# Patient Record
Sex: FEMALE | Race: WHITE
[De-identification: names, ages, dates, MRNs, and addresses within clinical notes are randomized per-mention and may not be internally consistent; named-entity substitution may affect disease eponyms.]

---

## 2017-10-20 ENCOUNTER — HOSPITAL ENCOUNTER (EMERGENCY)
Dept: HOSPITAL 82 - ED | Age: 47
Discharge: HOME | DRG: 605 | End: 2017-10-20
Payer: SELF-PAY

## 2017-10-20 VITALS — HEIGHT: 66 IN | WEIGHT: 220.46 LBS | BODY MASS INDEX: 35.43 KG/M2

## 2017-10-20 VITALS — DIASTOLIC BLOOD PRESSURE: 73 MMHG | SYSTOLIC BLOOD PRESSURE: 140 MMHG

## 2017-10-20 DIAGNOSIS — S20.211A: Primary | ICD-10-CM

## 2017-10-20 DIAGNOSIS — M54.5: ICD-10-CM
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
## 2017-11-10 ENCOUNTER — HOSPITAL ENCOUNTER (EMERGENCY)
Dept: HOSPITAL 82 - ED | Age: 47
Discharge: HOME | DRG: 153 | End: 2017-11-10
Payer: SELF-PAY

## 2017-11-10 VITALS — HEIGHT: 66 IN | WEIGHT: 223.11 LBS | BODY MASS INDEX: 35.86 KG/M2

## 2017-11-10 VITALS — DIASTOLIC BLOOD PRESSURE: 87 MMHG | SYSTOLIC BLOOD PRESSURE: 137 MMHG

## 2017-11-10 DIAGNOSIS — R09.81: ICD-10-CM

## 2017-11-10 DIAGNOSIS — L08.9: ICD-10-CM

## 2017-11-10 DIAGNOSIS — J02.0: Primary | ICD-10-CM

## 2017-11-10 DIAGNOSIS — S63.612A: ICD-10-CM
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
## 2017-11-10 LAB
FLUBV AG SPEC QL IA: (no result)
HAV IGM SERPL QL IA: (no result)

## 2018-11-18 ENCOUNTER — HOSPITAL ENCOUNTER (EMERGENCY)
Dept: HOSPITAL 82 - ED | Age: 48
Discharge: HOME | DRG: 153 | End: 2018-11-18
Payer: SELF-PAY

## 2018-11-18 VITALS — HEIGHT: 66 IN | WEIGHT: 209.44 LBS | BODY MASS INDEX: 33.66 KG/M2

## 2018-11-18 VITALS — DIASTOLIC BLOOD PRESSURE: 78 MMHG | SYSTOLIC BLOOD PRESSURE: 117 MMHG

## 2018-11-18 DIAGNOSIS — J06.9: Primary | ICD-10-CM

## 2018-11-18 DIAGNOSIS — J45.909: ICD-10-CM

## 2019-08-25 ENCOUNTER — HOSPITAL ENCOUNTER (OUTPATIENT)
Dept: HOSPITAL 82 - ED | Age: 49
Setting detail: OBSERVATION
LOS: 3 days | Discharge: HOME | DRG: 392 | End: 2019-08-28
Attending: INTERNAL MEDICINE | Admitting: INTERNAL MEDICINE
Payer: SELF-PAY

## 2019-08-25 VITALS — SYSTOLIC BLOOD PRESSURE: 124 MMHG | DIASTOLIC BLOOD PRESSURE: 61 MMHG

## 2019-08-25 VITALS — WEIGHT: 203.31 LBS | BODY MASS INDEX: 32.67 KG/M2 | HEIGHT: 66 IN

## 2019-08-25 VITALS — DIASTOLIC BLOOD PRESSURE: 72 MMHG | SYSTOLIC BLOOD PRESSURE: 106 MMHG

## 2019-08-25 VITALS — DIASTOLIC BLOOD PRESSURE: 60 MMHG | SYSTOLIC BLOOD PRESSURE: 130 MMHG

## 2019-08-25 DIAGNOSIS — K57.32: Primary | ICD-10-CM

## 2019-08-25 DIAGNOSIS — J45.909: ICD-10-CM

## 2019-08-25 LAB
ALBUMIN SERPL-MCNC: 4.1 G/DL (ref 3.2–5)
ALP SERPL-CCNC: 127 U/L (ref 38–126)
ANION GAP SERPL CALCULATED.3IONS-SCNC: 14 MMOL/L
AST SERPL-CCNC: 19 U/L (ref 14–36)
BASOPHILS NFR BLD AUTO: 0 % (ref 0–3)
BILIRUB UR QL STRIP.AUTO: (no result)
BUN SERPL-MCNC: 12 MG/DL (ref 7–17)
BUN/CREAT SERPL: 15
CHLORIDE SERPL-SCNC: 106 MMOL/L (ref 95–108)
CO2 SERPL-SCNC: 25 MMOL/L (ref 22–30)
COLOR UR AUTO: YELLOW
CREAT SERPL-MCNC: 0.8 MG/DL (ref 0.5–1)
EOSINOPHIL NFR BLD AUTO: 0 % (ref 0–8)
EPI CELLS URNS QL MICRO: (no result) EPI/HPF
ERYTHROCYTE [DISTWIDTH] IN BLOOD BY AUTOMATED COUNT: 12.8 % (ref 11.5–15.5)
GLUCOSE UR STRIP.AUTO-MCNC: NEGATIVE MG/DL
HCT VFR BLD AUTO: 43.6 % (ref 37–47)
HGB BLD-MCNC: 14.4 G/DL (ref 12–16)
HGB UR QL STRIP.AUTO: (no result)
IMM GRANULOCYTES NFR BLD: 0.3 % (ref 0–5)
KETONES UR STRIP.AUTO-MCNC: 40 MG/DL
LEUKOCYTE ESTERASE UR QL STRIP.AUTO: (no result)
LIPASE SERPL-CCNC: 65 U/L (ref 23–300)
LYMPHOCYTES NFR BLD: 8 % (ref 15–41)
MCH RBC QN AUTO: 28.9 PG  CALC (ref 26–32)
MCHC RBC AUTO-ENTMCNC: 33 G/L CALC (ref 32–36)
MCV RBC AUTO: 87.6 FL  CALC (ref 80–100)
MONOCYTES NFR BLD AUTO: 6 % (ref 2–13)
NEUTROPHILS # BLD AUTO: 12.45 THOU/UL (ref 2–7.15)
NEUTROPHILS NFR BLD AUTO: 85 % (ref 42–76)
NITRITE UR QL STRIP.AUTO: NEGATIVE
PH UR STRIP.AUTO: 7 [PH] (ref 4.5–8)
PLATELET # BLD AUTO: 248 THOU/UL (ref 130–400)
POTASSIUM SERPL-SCNC: 4.2 MMOL/L (ref 3.5–5.1)
PROT SERPL-MCNC: 7.3 G/DL (ref 6.3–8.2)
PROT UR QL STRIP.AUTO: NEGATIVE MG/DL
RBC # BLD AUTO: 4.98 MILL/UL (ref 4.2–5.6)
RBC #/AREA URNS HPF: (no result) RBC/HPF (ref 0–5)
SODIUM SERPL-SCNC: 141 MMOL/L (ref 137–146)
SP GR UR STRIP.AUTO: 1.01
UROBILINOGEN UR QL STRIP.AUTO: 1 E.U./DL
WBC #/AREA URNS HPF: (no result) WBC/HPF (ref 0–5)

## 2019-08-25 PROCEDURE — G0378 HOSPITAL OBSERVATION PER HR: HCPCS

## 2019-08-25 NOTE — NUR
ASSESSMENT COMPLETED. IV SITE PATENT AND ORDERED IVF INFUSING WELL, NEW BAG OF
ORDERED IVF HUNG AT THIS TIME. DENIES PAIN. ICE CHIPS PROVIDED PER PT'S
REQUEST. FAMILY IS AT BEDSIDE. ENCOURAGED TO CALL FOR ANY NEEDS. CALL LIGHT IS
IN REACH. WILL CONTINUE TO MONITOR.

## 2019-08-25 NOTE — NUR
PT RESTING IN BED WITH EYES CLOSED, EASILY AROUSED TO VERBAL STIMULI, PT
REQUESTS DOOR CLOSED, CALL LIGHT IN REACH,CONTINUE TO MONITOR.

## 2019-08-25 NOTE — NUR
PT. RESTING IN BED WITH NO DISTRESS NOTED;DENIES NEED AND VOICES NO CONCERNS.
SCHED [place] PE[place]A. CALL LIGHT IS IN REACH. ENCOURAGED TO CALL FOR ANY NEEDS.

## 2019-08-25 NOTE — NUR
PT HAD EPISODE OF EMESIS, 300CC. MEDICATED WITH ZOFRAN AND DILAUDID FOR PAIN.
PT TOLERATED WELL. CALL LIGHT IN REACH,CONTINUE TO MONITOR.

## 2019-08-25 NOTE — NUR
PT. C/O ABDOMINAL PAIN 7/10 AND AND MEDICATED WITH ORDERED PRN DIALUDID ALSO
MEDICATED WITH PRN ZOFRAN AS PT. REPORTS SHE DOES NOT WANT TO GET NAUSEOUS
FROM PAIN MEDICINE. DENIES FURTHER NEEDS. CALL LIGHT IS IN REACH.

## 2019-08-25 NOTE — NUR
PT ARRIVED TO [facility]2 VIA STRETCHER ACCOMPANIED BY ER NURSE AND . PT ALERT
AND ORIENTED X3, AMBULATORY, AMBULATED WITH STEADY GAIT TO STANDING SCALE,
WEIGHT OBTAINED. ORIENTED PT TO ROOM AND CALL LIGHT. DISCUSSED POC, CIPRO AND
FLAGYL INFUSING. PT STATES SHE IS NAUSEOUS, EMESIS BAG GIVEN. AWAITING ORDERS.
ADMISSION ASSESSMENT COMPLETED. CALL LIGHT IN REACH,CONTINUE TO MONITOR.

## 2019-08-26 VITALS — DIASTOLIC BLOOD PRESSURE: 62 MMHG | SYSTOLIC BLOOD PRESSURE: 121 MMHG

## 2019-08-26 VITALS — SYSTOLIC BLOOD PRESSURE: 96 MMHG | DIASTOLIC BLOOD PRESSURE: 63 MMHG

## 2019-08-26 VITALS — SYSTOLIC BLOOD PRESSURE: 129 MMHG | DIASTOLIC BLOOD PRESSURE: 75 MMHG

## 2019-08-26 VITALS — SYSTOLIC BLOOD PRESSURE: 124 MMHG | DIASTOLIC BLOOD PRESSURE: 67 MMHG

## 2019-08-26 LAB
ALBUMIN SERPL-MCNC: 3.2 G/DL (ref 3.2–5)
ALP SERPL-CCNC: 90 U/L (ref 38–126)
ANION GAP SERPL CALCULATED.3IONS-SCNC: 9 MMOL/L
AST SERPL-CCNC: 15 U/L (ref 14–36)
BASOPHILS NFR BLD AUTO: 0 % (ref 0–3)
BUN SERPL-MCNC: 8 MG/DL (ref 7–17)
BUN/CREAT SERPL: 11
CHLORIDE SERPL-SCNC: 107 MMOL/L (ref 95–108)
CO2 SERPL-SCNC: 27 MMOL/L (ref 22–30)
CREAT SERPL-MCNC: 0.7 MG/DL (ref 0.5–1)
EOSINOPHIL NFR BLD AUTO: 0 % (ref 0–8)
ERYTHROCYTE [DISTWIDTH] IN BLOOD BY AUTOMATED COUNT: 12.7 % (ref 11.5–15.5)
HCT VFR BLD AUTO: 37.1 % (ref 37–47)
HGB BLD-MCNC: 12.2 G/DL (ref 12–16)
IMM GRANULOCYTES NFR BLD: 0.4 % (ref 0–5)
LYMPHOCYTES NFR BLD: 10 % (ref 15–41)
MCH RBC QN AUTO: 29.4 PG  CALC (ref 26–32)
MCHC RBC AUTO-ENTMCNC: 32.9 G/L CALC (ref 32–36)
MCV RBC AUTO: 89.4 FL  CALC (ref 80–100)
MONOCYTES NFR BLD AUTO: 7 % (ref 2–13)
NEUTROPHILS # BLD AUTO: 9.85 THOU/UL (ref 2–7.15)
NEUTROPHILS NFR BLD AUTO: 82 % (ref 42–76)
PLATELET # BLD AUTO: 216 THOU/UL (ref 130–400)
POTASSIUM SERPL-SCNC: 4 MMOL/L (ref 3.5–5.1)
PROT SERPL-MCNC: 6.1 G/DL (ref 6.3–8.2)
RBC # BLD AUTO: 4.15 MILL/UL (ref 4.2–5.6)
SODIUM SERPL-SCNC: 139 MMOL/L (ref 137–146)

## 2019-08-26 NOTE — NUR
WENT TO ADMINISTER CIPRO AND ZOFRAN AND PT'S IV SITE APPEARS SLIGHTLY FIRM AND
REPORTS PAIN AT SITE, REMOVED AND CATHETER TIP IS INTACT. NEW IV STARTED TO
RIGHT OUTER AC X1 ATTEMPT AND PT. TOLERATED WELL; MEDICATIONS ADMINISTERED AT
THIS TIME. POPCICLE GIVEN AND WARM PACK APPLIED TO KORIN. DENIES FURTHER NEEDS.
CALL LIGHT IS IN REACH.

## 2019-08-26 NOTE — NUR
PT RESTING IN BED WATCHING TV;RESPIRATIONS EVEN AND UNLABORED ON RA;PT DENIES
ANY CURRENT PAIN OR DISCOMFORTS; IV ABX INFUSING TO LEFT UPPER ARM WITH
EASE;PT DENIES ANY ADDITIONAL NEEDS AT THIS TIME;ASSESSMENT
UNCHANGED;INSTRUCTED TO CALL FOR ASSISTANCE IF NEEDED;CALL LIGHT IN REACH;WILL
CONTINUE TO MONITOR

## 2019-08-26 NOTE — NUR
PT RESTING IN SEMI FOWLERS POSITION;RESPIRATIONS EVEN AND UNLABORED ON RA;PT
DENIES ANY CURRENT PAIN OR DISCOMFORTS;IV FLUIDS INFUSING TO LUQ WITH EASE PER
ORDER;PT DENIES ANY ADDITIONAL NEEDS AT THIS TIME AND IS ENCOURAGED TO CALL
FOR ASSISTANCE IF NEEDED;CALL LIGHT IN REACH;WILL CONTINUE TO MONITOR

## 2019-08-26 NOTE — NUR
PT VOMITED 100CC OF YELLOW BILE INTO EMESIS BAG;PT MEDICATED WITH PRN ZOFRAN
4MG IVP AT THIS TIME;WARM WASH CLOTHS PROVIDED;PT DENIES ANY ADDITIONAL
NEEDS;WILL CONTINUE TO MONITOR FOR EFFECTIVENESS

## 2019-08-26 NOTE — NUR
PT. VOMITED 100MLS OF EMESIS AND MEDICATED WITH ORDERED ZOFRAN. TOOTHBRUSH AND
TOOTHPASTE GIVEN ALONG WITH MOUTHWASH SO PT. COULD TEND TO ORAL HYGEINE. NEW
EMESIS BAGS GIVEN.ENCOURAGED TO CALL FOR ANY NEEDS. CALL LIGHT IS IN REACH.

## 2019-08-26 NOTE — NUR
PT RESTING IN SEMI FOWLERS POSITION WATCHING TV, A&O X3;VS OBTAINED AND
ASSESSMENT COMPLETED;PT DENIES ANY CURRENT PAIN BUT DOES REPORT TENDERNESS TO
ABDOMEN, PAIN SCALE AND REPORTING EDUCATED;RESPIRATIONS EVEN AND UNLABORED ON
RA,CLEAR LUNG SOUNDS;ABDOMEN DISTENDED/SOFT ON PALPATION, TENDERNESS NOTED
THROUGHOUT WITH HYPOACTIVE BOWEL SOUNDS;STRONG PEDAL PULSES;SKIN INTACT;#20G
TO LEFT UPPER ARM INFUSING D5 1/2 NS @ 125ML/HR,SITE APPEARS HEALTHY;PT DENIES
ANY ADDITIONAL NEEDS AT THIS TIME AND IS ENCOURAGED TO CALL FOR ASSISTANCE IF
NEEDED;CALL LIGHT IN REACH;WILL CONTINUE TO MONITOR

## 2019-08-26 NOTE — NUR
ASSESSMENT COMPLETED. NO DISTRESS NOTED. RE-EDUCATED PT. ON NEED FOR STOOL AND
MEASURING HAT PLACED IN BATHROOM TO OBTAIN SPECIMEN IF PT. GOES. DENIES
NEEDS/PAIN. ENCOURAGED TO CALL FOR ANY NEEDS. CALL LIGHT IS IN REACH.

## 2019-08-26 NOTE — NUR
PT. C/O HA 3/10 AND MEDICATED WITH ORDERED TYLENOL. WILL REASSESS.DENIES
FURTHER NEEDS. CALL LIGHT IS IN REACH.

## 2019-08-26 NOTE — NUR
REPORT RECEIVED FROM THAORN;PT RESTING IN SEMI FOWLERS POSITION WATCHING
TV;INTRODUCED SELF TO PT AND POC DISCUSSED;RESPIRATIONS EVEN AND UNLABORED ON
RA;PT DENIES ANY CURRENT PAIN OR DISCOMFORTS,PAIN SCALE AND REPORTING
EDUCATED;IV FLUIDS INFUSING TO KORIN WITH EASE;PT INSTRUCTED TO CALL FOR
ASSISTANCE IF NEEDED;FALL PRECAUTIONS IN PLACE WITH CALL LIGHT IN REACH;WILL
CONTINUE TO MONITOR

## 2019-08-27 VITALS — SYSTOLIC BLOOD PRESSURE: 136 MMHG | DIASTOLIC BLOOD PRESSURE: 68 MMHG

## 2019-08-27 VITALS — SYSTOLIC BLOOD PRESSURE: 98 MMHG | DIASTOLIC BLOOD PRESSURE: 64 MMHG

## 2019-08-27 VITALS — DIASTOLIC BLOOD PRESSURE: 65 MMHG | SYSTOLIC BLOOD PRESSURE: 107 MMHG

## 2019-08-27 VITALS — DIASTOLIC BLOOD PRESSURE: 60 MMHG | SYSTOLIC BLOOD PRESSURE: 108 MMHG

## 2019-08-27 LAB
ALBUMIN SERPL-MCNC: 3.2 G/DL (ref 3.2–5)
ALP SERPL-CCNC: 86 U/L (ref 38–126)
ANION GAP SERPL CALCULATED.3IONS-SCNC: 9 MMOL/L
AST SERPL-CCNC: 16 U/L (ref 14–36)
BASOPHILS NFR BLD AUTO: 0 % (ref 0–3)
BUN SERPL-MCNC: 6 MG/DL (ref 7–17)
BUN/CREAT SERPL: 8
CHLORIDE SERPL-SCNC: 108 MMOL/L (ref 95–108)
CO2 SERPL-SCNC: 28 MMOL/L (ref 22–30)
CREAT SERPL-MCNC: 0.8 MG/DL (ref 0.5–1)
EOSINOPHIL NFR BLD AUTO: 2 % (ref 0–8)
ERYTHROCYTE [DISTWIDTH] IN BLOOD BY AUTOMATED COUNT: 12.6 % (ref 11.5–15.5)
HCT VFR BLD AUTO: 37.6 % (ref 37–47)
HGB BLD-MCNC: 12.4 G/DL (ref 12–16)
IMM GRANULOCYTES NFR BLD: 0.1 % (ref 0–5)
LYMPHOCYTES NFR BLD: 15 % (ref 15–41)
MCH RBC QN AUTO: 29.3 PG  CALC (ref 26–32)
MCHC RBC AUTO-ENTMCNC: 33 G/L CALC (ref 32–36)
MCV RBC AUTO: 88.9 FL  CALC (ref 80–100)
MONOCYTES NFR BLD AUTO: 7 % (ref 2–13)
NEUTROPHILS # BLD AUTO: 6.75 THOU/UL (ref 2–7.15)
NEUTROPHILS NFR BLD AUTO: 77 % (ref 42–76)
PLATELET # BLD AUTO: 222 THOU/UL (ref 130–400)
POTASSIUM SERPL-SCNC: 3.8 MMOL/L (ref 3.5–5.1)
PROT SERPL-MCNC: 6.1 G/DL (ref 6.3–8.2)
RBC # BLD AUTO: 4.23 MILL/UL (ref 4.2–5.6)
SODIUM SERPL-SCNC: 141 MMOL/L (ref 137–146)

## 2019-08-27 NOTE — NUR
PT RESTING IN BED, IV FLAGYL HUNG, PT VOICES NO NEEDS OR COMPLAINTS AT THIS
TIME,CALL LIGHT IN REACH,CONTINUE TO MONITOR.

## 2019-08-27 NOTE — NUR
PT RESTING IN BED WATCHING TV;RESPIRATIONS EVEN AND UNLABORED ON RA;PT DENIES
ANY CURRENT PAIN OR NEEDS;IV FLUIDS CONTINUE TO INFUSE AT 60ML/HR PER
ORDER;ASSESSMENT REMAINS UNCHANGED;ENCOURAGED TO CALL FOR ASSISTANCE IF
NEEDED;CALL LIGHT IN REACH;WILL CONTINUE TO MONITOR

## 2019-08-27 NOTE — NUR
PT. HAD A LOOSE STOOL UNABLE TO OBTAIN SAMPLE DUE TO CONTAMINATION. REPLACED
NEW MEASURING HAT IN TOILET.

## 2019-08-27 NOTE — NUR
PT RESTING IN SEMI FOWLERS POSITION, A&O X3;VS OBTAINED AND ASSESSMENT
COMPLETED;PT DENIES ANY CURRENT PAIN OR DISCOMFORTS, PAIN SCALE AND REPORTING
EDUCATED;RESPIRATIONS EVEN AND UNLABORED ON RA, CLEAR LUNG SOUNDS;ABDOMEN
DISTENDED/SOFT ON PALPATION AND ACTIVE IN ALL 4 QUADRANTS,TENDERNESS NOTED TO
LLQ;STRONG PEDAL PULSES;SKIN INTACT;#22G TO RAC INFUSING D5 1/2 NS @ 60ML/HR
PER ORDER,SITE APPEARS HEALTHY;PT DENIES ANY ADDITIONAL NEEDS AT THIS
TIME;SHOWER SET UP PER REQUEST;ENCOUARGED TO CALL FOR ASSISTANCE IF
NEEDED;CALL LIGHT IN REACH;WILL CONTINUE TO MONITOR

## 2019-08-27 NOTE — NUR
PT RESTING IN BED WATCHING TV, NO SIGNS OF DISTRESS NOTED, RESP EVEN AND
UNLABORED. PT VOICES NO NEEDS OR COMPLAINTS AT THIS TIME. DISCUSSED
POC,VERBALIZED UNDERSTANDING. PT STATES SHE FEELS SO MUCH BETTER. ASSESSMENT
COMPLETED AT THIS TIME. CALL LIGHT IN REACH,CONTINUE TO MONITOR.

## 2019-08-27 NOTE — NUR
PT. VOMITED 300MLS IN EMESIS BAG. NOTIFIED DR. PARRY OF PRE-MEDICATING PT.
PRIOR TO CIPRO INFUSION WITH ZOFRAN AND THAT NOW PT. IS VOMITING. NEW ORDERS
RECEIVED AND TO BE CARRIED OUT.

## 2019-08-27 NOTE — NUR
PT OOB RESTING IN RECLINER;RESPIRATIONS EVEN AND UNLABORED ON RA;PT DENIES ANY
CURRENT PAIN;SALTINE CRACKERS PROVIDED PER REQUEST;IV FLUIDS INFUSING TO RAC
PER ORDER;ASSESSMENT REMAINS UNCHANGED;INSTRUCTED PT TO CALL FOR ASSISTANCE IF
NEEDED;CALL LIGHT IN REACH;WILL CONTINUE TO MONITOR

## 2019-08-27 NOTE — NUR
REPORT RECEIVED FROM MELISSA OSUNA;PT RESTING IN SEMI FOWLERS POSITION;INTRODUCED
SELF TO PT AND POC DISCUSSED;RESPIRATIONS EVEN AND UNLABORED ON RA;PT DENIES
ANY CURRENT PAIN OR DISCOMFORTS;IV FLUIDS INFUSING TO RAC WITH EASE;ENCOURAGED
TO CALL FOR ASSISTANCE IF NEEDED;CALL LIGHT IN REACH;WILL CONTINUE TO MONITOR

## 2019-08-27 NOTE — NUR
PT. RESTING IN BED WITH NO DISTRESS NOTED; DENIES NEEDS/PAIN. ENCOURAGED TO
CALL FOR ANY NEEDS. CALL LIGHT IS IN REACH. WILL CONTINUE TO MONITOR.

## 2019-08-28 VITALS — DIASTOLIC BLOOD PRESSURE: 65 MMHG | SYSTOLIC BLOOD PRESSURE: 123 MMHG

## 2019-08-28 VITALS — DIASTOLIC BLOOD PRESSURE: 80 MMHG | SYSTOLIC BLOOD PRESSURE: 126 MMHG

## 2019-08-28 NOTE — NUR
PT RESTING IN SEMI FOWLERS POSITION,A&O X3;VS OBTAINED AND ASSESSMENT
COMPLETED;PT DENIES ANY CURRENT PAIN OR DISCOMFORTS, PAIN SCALE AND REPORTING
EDUCATED;RESPIRATIONS EVEN AND UNLABORED ON RA,CLEAR LUNG SOUNDS;ABDOMEN SOFT
ON PALPATION AND ACTIVE IN ALL 4 QUADRANTS;STRONG PEDAL PULSES;SKIN
INTACT;#22G TO RAC INFUSING D5 1/2 NS @ 60ML/HR,SITE APPEARS HEALTHY;PT DENIES
ANY ADDITIONAL NEEDS AT THIS TIME AND IS ENCOURAGED TO CALL FOR ASSISTANCE IF
NEEDED;CALL LIGHT IN REACH;WILL CONTINUE TO MONITOR

## 2019-08-28 NOTE — NUR
PT RESTING IN BED WATCHING TV;RESPIRATIONS EVEN AND UNLABORED ON RA;PT DENIES
ANY CURRENT PAIN OR DISCOMFORTS;PT EAGER TO BE D/C, RE-EDUCATED ON 
ORDERS TO RECEIVE ONE MORE DOSE OF FLAGYL BEFORE DISCHARGE HOME;IV FLUIDS
CONTINUE TO INFUSE TO RAC WITH EASE;PT DENIES ANY ADDITIONAL NEEDS AT THIS
TIME AND IS ENCOURAGED TO CALL FOR ASSISTANCE IF NEEDED;CALL LIGHT IN
REACH;WILL CONTINUE TO MONITOR

## 2019-08-28 NOTE — NUR
Discharge instructions given. Patient verbalizes understanding of same.
Discharged in stable condition via Wheelchair to Home with
family. All belongings sent with pt.
Pt transported to [facility] for discharge home in stable condition via wheelchair
accompanied by family member and volunteer.

## 2019-08-28 NOTE — NUR
AT BEDSIDE DISCUSSING POC INCLUDING PLAN TO DISCHARGE HOME THIS
AFTERNOON, PT VERBALIZES UNDERSTANDING.

## 2019-08-28 NOTE — NUR
REPORT RECEIVED FROM HENRY CAPPS;PT RESTING IN SEMI FOWLERS POSITION;INTRODUCED
SELF TO PT AND POC DISCUSSED;RESPIRATIONS EVEN AND UNLABORED ON RA;PT DENIES
ANY CURRENT PAIN OR DISCOMFORTS;IV FLUIDS INFUSING TO RAC WITH EASE; PT DENIES
ANY ADDITIONAL NEEDS AT THIS TIME AND IS ENCOURAGED TO CALL FOR ASSISTANCE IF
NEEDED;CALL LIGHT IN REACH;WILL CONTINUE TO MONITOR

## 2019-08-28 NOTE — NUR
ALL DISCHARGE INSTRUCTIONS PROVIDED AT THIS TIME, PT INSTRUCTED TO  RX
FOR FLAGYL AND LEVOQUIN FROM PHARMACY;ALL QUESTIONS ANSWERED AT THIS TIME;IV
SITE REMOVED WITH CATHETER INTACT;PT DENIES ANY ADDIITONAL NEEDS AT THIS
TIME;WHEELCHAIR TO BE PROVIDED FOR DISCHARGE HOME;AWAITING FAMILY FOR
TRANSPORTATION.

## 2019-08-28 NOTE — NUR
PT RESTING IN BED, NO SIGNS OF DISTRESS NOTED, RESP EVEN AND UNLABORED. PT
VOICES NO NEEDS OR COMPLAINTS AT THIS TIME. CALL LIGHT IN REACH,CONTINUE TO
MONITOR.

## 2019-08-31 ENCOUNTER — HOSPITAL ENCOUNTER (EMERGENCY)
Dept: HOSPITAL 82 - ED | Age: 49
Discharge: HOME | DRG: 607 | End: 2019-08-31
Payer: SELF-PAY

## 2019-08-31 VITALS — BODY MASS INDEX: 34.12 KG/M2 | HEIGHT: 66 IN | WEIGHT: 212.31 LBS

## 2019-08-31 VITALS — DIASTOLIC BLOOD PRESSURE: 78 MMHG | SYSTOLIC BLOOD PRESSURE: 122 MMHG

## 2019-08-31 DIAGNOSIS — L27.0: Primary | ICD-10-CM
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
## 2019-08-31 DIAGNOSIS — T36.8X5A: ICD-10-CM

## 2023-03-15 ENCOUNTER — HOSPITAL ENCOUNTER (EMERGENCY)
Dept: HOSPITAL 82 - ED | Age: 53
Discharge: HOME | DRG: 392 | End: 2023-03-15
Payer: SELF-PAY

## 2023-03-15 VITALS — WEIGHT: 199.96 LBS | HEIGHT: 66 IN | BODY MASS INDEX: 32.14 KG/M2

## 2023-03-15 VITALS — DIASTOLIC BLOOD PRESSURE: 71 MMHG | SYSTOLIC BLOOD PRESSURE: 106 MMHG

## 2023-03-15 VITALS — SYSTOLIC BLOOD PRESSURE: 106 MMHG | DIASTOLIC BLOOD PRESSURE: 71 MMHG

## 2023-03-15 VITALS — DIASTOLIC BLOOD PRESSURE: 61 MMHG | SYSTOLIC BLOOD PRESSURE: 100 MMHG

## 2023-03-15 DIAGNOSIS — R10.32: Primary | ICD-10-CM

## 2024-04-18 ENCOUNTER — APPOINTMENT (RX ONLY)
Dept: URBAN - NONMETROPOLITAN AREA CLINIC 10 | Facility: CLINIC | Age: 54
Setting detail: DERMATOLOGY
End: 2024-04-18

## 2024-04-18 DIAGNOSIS — L57.8 OTHER SKIN CHANGES DUE TO CHRONIC EXPOSURE TO NONIONIZING RADIATION: ICD-10-CM

## 2024-04-18 DIAGNOSIS — D49.2 NEOPLASM OF UNSPECIFIED BEHAVIOR OF BONE, SOFT TISSUE, AND SKIN: ICD-10-CM

## 2024-04-18 PROCEDURE — ? BIOPSY BY SHAVE METHOD

## 2024-04-18 PROCEDURE — 99203 OFFICE O/P NEW LOW 30 MIN: CPT | Mod: 25

## 2024-04-18 PROCEDURE — 69100 BIOPSY OF EXTERNAL EAR: CPT

## 2024-04-18 PROCEDURE — 88305 TISSUE EXAM BY PATHOLOGIST: CPT

## 2024-04-18 PROCEDURE — ? COUNSELING

## 2024-04-18 ASSESSMENT — LOCATION DETAILED DESCRIPTION DERM
LOCATION DETAILED: SUPERIOR MID FOREHEAD
LOCATION DETAILED: RIGHT SUPERIOR HELIX

## 2024-04-18 ASSESSMENT — LOCATION ZONE DERM
LOCATION ZONE: FACE
LOCATION ZONE: EAR

## 2024-04-18 ASSESSMENT — LOCATION SIMPLE DESCRIPTION DERM
LOCATION SIMPLE: SUPERIOR FOREHEAD
LOCATION SIMPLE: RIGHT EAR

## 2024-04-18 NOTE — PROCEDURE: BIOPSY BY SHAVE METHOD
Detail Level: Detailed
Depth Of Biopsy: dermis
Was A Bandage Applied: Yes
Size Of Lesion In Cm: 0
Biopsy Type: H and E
Biopsy Method: sterile single edge surgical blade
Anesthesia Type: 1% lidocaine with epinephrine and a 1:10 solution of 8.4% sodium bicarbonate
Anesthesia Volume In Cc: 0.5
Hemostasis: Electrocautery
Wound Care: Aquaphor
Dressing: bandage
Destruction After The Procedure: No
Type Of Destruction Used: Curettage
Curettage Text: The wound bed was treated with curettage after the biopsy was performed.
Cryotherapy Text: The wound bed was treated with cryotherapy after the biopsy was performed.
Electrodesiccation Text: The wound bed was treated with electrodesiccation after the biopsy was performed.
Electrodesiccation And Curettage Text: The wound bed was treated with electrodesiccation and curettage after the biopsy was performed.
Silver Nitrate Text: The wound bed was treated with silver nitrate after the biopsy was performed.
Lab: [id number]
Consent: Written consent was obtained and risks were reviewed including but not limited to scarring, infection, bleeding, scabbing, incomplete removal, nerve damage and allergy to anesthesia.
Post-Care Instructions: I reviewed with the patient in detail post-care instructions. Patient is to keep the biopsy site dry overnight, and then apply bacitracin twice daily until healed. Patient may apply hydrogen peroxide soaks to remove any crusting.
Notification Instructions: Patient will be notified of biopsy results. However, patient instructed to call the office if not contacted within 2 weeks.
Billing Type: Third-Party Bill
Information: Selecting Yes will display possible errors in your note based on the variables you have selected. This validation is only offered as a suggestion for you. PLEASE NOTE THAT THE VALIDATION TEXT WILL BE REMOVED WHEN YOU FINALIZE YOUR NOTE. IF YOU WANT TO FAX A PRELIMINARY NOTE YOU WILL NEED TO TOGGLE THIS TO 'NO' IF YOU DO NOT WANT IT IN YOUR FAXED NOTE.

## 2024-12-14 ENCOUNTER — HOSPITAL ENCOUNTER (EMERGENCY)
Dept: HOSPITAL 82 - ED | Age: 54
Discharge: HOME | DRG: 153 | End: 2024-12-14
Payer: SELF-PAY

## 2024-12-14 VITALS — DIASTOLIC BLOOD PRESSURE: 96 MMHG | SYSTOLIC BLOOD PRESSURE: 143 MMHG

## 2024-12-14 VITALS — BODY MASS INDEX: 33.66 KG/M2 | WEIGHT: 209.44 LBS | HEIGHT: 66 IN

## 2024-12-14 DIAGNOSIS — J45.909: ICD-10-CM

## 2024-12-14 DIAGNOSIS — J06.9: Primary | ICD-10-CM
